# Patient Record
Sex: MALE | Race: WHITE
[De-identification: names, ages, dates, MRNs, and addresses within clinical notes are randomized per-mention and may not be internally consistent; named-entity substitution may affect disease eponyms.]

---

## 2018-07-09 ENCOUNTER — SURGERY (OUTPATIENT)
Age: 68
End: 2018-07-09

## 2018-07-09 ENCOUNTER — ANESTHESIA (OUTPATIENT)
Dept: SURGERY | Facility: CLINIC | Age: 68
DRG: 483 | End: 2018-07-09
Payer: MEDICARE

## 2018-07-09 ENCOUNTER — HOSPITAL ENCOUNTER (INPATIENT)
Facility: CLINIC | Age: 68
LOS: 1 days | Discharge: HOME | DRG: 483 | End: 2018-07-10
Attending: ORTHOPAEDIC SURGERY | Admitting: ORTHOPAEDIC SURGERY
Payer: MEDICARE

## 2018-07-09 ENCOUNTER — ANESTHESIA EVENT (OUTPATIENT)
Dept: SURGERY | Facility: CLINIC | Age: 68
DRG: 483 | End: 2018-07-09
Payer: MEDICARE

## 2018-07-09 ENCOUNTER — APPOINTMENT (OUTPATIENT)
Dept: GENERAL RADIOLOGY | Facility: CLINIC | Age: 68
DRG: 483 | End: 2018-07-09
Attending: ORTHOPAEDIC SURGERY
Payer: MEDICARE

## 2018-07-09 DIAGNOSIS — Z96.611 STATUS POST REVERSE TOTAL REPLACEMENT OF RIGHT SHOULDER: Primary | ICD-10-CM

## 2018-07-09 PROBLEM — Z96.619 S/P REVERSE TOTAL SHOULDER ARTHROPLASTY: Status: ACTIVE | Noted: 2018-07-09

## 2018-07-09 LAB
ANION GAP SERPL CALCULATED.3IONS-SCNC: 7 MMOL/L (ref 3–14)
BUN SERPL-MCNC: 21 MG/DL (ref 7–30)
CALCIUM SERPL-MCNC: 9.1 MG/DL (ref 8.5–10.1)
CHLORIDE SERPL-SCNC: 108 MMOL/L (ref 94–109)
CO2 SERPL-SCNC: 29 MMOL/L (ref 20–32)
CREAT SERPL-MCNC: 0.83 MG/DL (ref 0.66–1.25)
GFR SERPL CREATININE-BSD FRML MDRD: >90 ML/MIN/1.7M2
GLUCOSE SERPL-MCNC: 103 MG/DL (ref 70–99)
HGB BLD-MCNC: 14.9 G/DL (ref 13.3–17.7)
POTASSIUM SERPL-SCNC: 4 MMOL/L (ref 3.4–5.3)
SODIUM SERPL-SCNC: 144 MMOL/L (ref 133–144)

## 2018-07-09 PROCEDURE — 25000128 H RX IP 250 OP 636: Performed by: NURSE ANESTHETIST, CERTIFIED REGISTERED

## 2018-07-09 PROCEDURE — 37000008 ZZH ANESTHESIA TECHNICAL FEE, 1ST 30 MIN: Performed by: ORTHOPAEDIC SURGERY

## 2018-07-09 PROCEDURE — A9270 NON-COVERED ITEM OR SERVICE: HCPCS | Mod: GY | Performed by: ORTHOPAEDIC SURGERY

## 2018-07-09 PROCEDURE — C1713 ANCHOR/SCREW BN/BN,TIS/BN: HCPCS | Performed by: ORTHOPAEDIC SURGERY

## 2018-07-09 PROCEDURE — 71000013 ZZH RECOVERY PHASE 1 LEVEL 1 EA ADDTL HR: Performed by: ORTHOPAEDIC SURGERY

## 2018-07-09 PROCEDURE — 71000012 ZZH RECOVERY PHASE 1 LEVEL 1 FIRST HR: Performed by: ORTHOPAEDIC SURGERY

## 2018-07-09 PROCEDURE — 40000169 ZZH STATISTIC PRE-PROCEDURE ASSESSMENT I: Performed by: ORTHOPAEDIC SURGERY

## 2018-07-09 PROCEDURE — 25000128 H RX IP 250 OP 636: Performed by: ANESTHESIOLOGY

## 2018-07-09 PROCEDURE — C1776 JOINT DEVICE (IMPLANTABLE): HCPCS | Performed by: ORTHOPAEDIC SURGERY

## 2018-07-09 PROCEDURE — 40000986 XR SHOULDER RT PORT G/E 2 VW: Mod: RT

## 2018-07-09 PROCEDURE — 25000566 ZZH SEVOFLURANE, EA 15 MIN: Performed by: ORTHOPAEDIC SURGERY

## 2018-07-09 PROCEDURE — 25000125 ZZHC RX 250: Performed by: NURSE ANESTHETIST, CERTIFIED REGISTERED

## 2018-07-09 PROCEDURE — L3670 SO ACRO/CLAV CAN WEB PRE OTS: HCPCS

## 2018-07-09 PROCEDURE — 25000132 ZZH RX MED GY IP 250 OP 250 PS 637: Mod: GY | Performed by: ORTHOPAEDIC SURGERY

## 2018-07-09 PROCEDURE — 25000128 H RX IP 250 OP 636: Performed by: ORTHOPAEDIC SURGERY

## 2018-07-09 PROCEDURE — 12000007 ZZH R&B INTERMEDIATE

## 2018-07-09 PROCEDURE — 37000009 ZZH ANESTHESIA TECHNICAL FEE, EACH ADDTL 15 MIN: Performed by: ORTHOPAEDIC SURGERY

## 2018-07-09 PROCEDURE — 80048 BASIC METABOLIC PNL TOTAL CA: CPT | Performed by: ORTHOPAEDIC SURGERY

## 2018-07-09 PROCEDURE — 25800025 ZZH RX 258: Performed by: ORTHOPAEDIC SURGERY

## 2018-07-09 PROCEDURE — 27210794 ZZH OR GENERAL SUPPLY STERILE: Performed by: ORTHOPAEDIC SURGERY

## 2018-07-09 PROCEDURE — 36000093 ZZH SURGERY LEVEL 4 1ST 30 MIN: Performed by: ORTHOPAEDIC SURGERY

## 2018-07-09 PROCEDURE — 36415 COLL VENOUS BLD VENIPUNCTURE: CPT | Performed by: ORTHOPAEDIC SURGERY

## 2018-07-09 PROCEDURE — 27210995 ZZH RX 272: Performed by: ORTHOPAEDIC SURGERY

## 2018-07-09 PROCEDURE — 36000063 ZZH SURGERY LEVEL 4 EA 15 ADDTL MIN: Performed by: ORTHOPAEDIC SURGERY

## 2018-07-09 PROCEDURE — 85018 HEMOGLOBIN: CPT | Performed by: ORTHOPAEDIC SURGERY

## 2018-07-09 PROCEDURE — 0RRJ00Z REPLACEMENT OF RIGHT SHOULDER JOINT WITH REVERSE BALL AND SOCKET SYNTHETIC SUBSTITUTE, OPEN APPROACH: ICD-10-PCS | Performed by: ORTHOPAEDIC SURGERY

## 2018-07-09 DEVICE — IMP SCR FIXATION 4.5X29MM AQLS VDV229: Type: IMPLANTABLE DEVICE | Site: SHOULDER | Status: FUNCTIONAL

## 2018-07-09 DEVICE — IMP COMP SHLDR GLENOID SPHERE AQLS REV II 36X29MM 10D DWD191: Type: IMPLANTABLE DEVICE | Site: SHOULDER | Status: FUNCTIONAL

## 2018-07-09 DEVICE — IMP BASEPLATE SHLDR 15MM AQLS DWD003: Type: IMPLANTABLE DEVICE | Site: SHOULDER | Status: FUNCTIONAL

## 2018-07-09 DEVICE — IMP SCR FIXATION 4.5X20MM AQLS VDV220: Type: IMPLANTABLE DEVICE | Site: SHOULDER | Status: FUNCTIONAL

## 2018-07-09 DEVICE — IMP SCR LOCKING 4.5X35MM AQLS DWD035: Type: IMPLANTABLE DEVICE | Site: SHOULDER | Status: FUNCTIONAL

## 2018-07-09 DEVICE — IMP SCR LOCKING 4.5X29MM AQLS DWD029: Type: IMPLANTABLE DEVICE | Site: SHOULDER | Status: FUNCTIONAL

## 2018-07-09 RX ORDER — MAGNESIUM HYDROXIDE 1200 MG/15ML
LIQUID ORAL PRN
Status: DISCONTINUED | OUTPATIENT
Start: 2018-07-09 | End: 2018-07-09 | Stop reason: HOSPADM

## 2018-07-09 RX ORDER — NALOXONE HYDROCHLORIDE 0.4 MG/ML
.1-.4 INJECTION, SOLUTION INTRAMUSCULAR; INTRAVENOUS; SUBCUTANEOUS
Status: DISCONTINUED | OUTPATIENT
Start: 2018-07-09 | End: 2018-07-10 | Stop reason: HOSPADM

## 2018-07-09 RX ORDER — HYDROMORPHONE HYDROCHLORIDE 1 MG/ML
.3-.5 INJECTION, SOLUTION INTRAMUSCULAR; INTRAVENOUS; SUBCUTANEOUS
Status: DISCONTINUED | OUTPATIENT
Start: 2018-07-09 | End: 2018-07-10 | Stop reason: HOSPADM

## 2018-07-09 RX ORDER — FENTANYL CITRATE 50 UG/ML
INJECTION, SOLUTION INTRAMUSCULAR; INTRAVENOUS PRN
Status: DISCONTINUED | OUTPATIENT
Start: 2018-07-09 | End: 2018-07-09

## 2018-07-09 RX ORDER — ONDANSETRON 4 MG/1
4 TABLET, ORALLY DISINTEGRATING ORAL EVERY 30 MIN PRN
Status: DISCONTINUED | OUTPATIENT
Start: 2018-07-09 | End: 2018-07-09 | Stop reason: HOSPADM

## 2018-07-09 RX ORDER — AMOXICILLIN 250 MG
1 CAPSULE ORAL 2 TIMES DAILY
Status: DISCONTINUED | OUTPATIENT
Start: 2018-07-09 | End: 2018-07-10 | Stop reason: HOSPADM

## 2018-07-09 RX ORDER — ONDANSETRON 2 MG/ML
4 INJECTION INTRAMUSCULAR; INTRAVENOUS EVERY 30 MIN PRN
Status: DISCONTINUED | OUTPATIENT
Start: 2018-07-09 | End: 2018-07-09 | Stop reason: HOSPADM

## 2018-07-09 RX ORDER — CEFAZOLIN SODIUM 2 G/100ML
2 INJECTION, SOLUTION INTRAVENOUS EVERY 8 HOURS
Status: COMPLETED | OUTPATIENT
Start: 2018-07-09 | End: 2018-07-10

## 2018-07-09 RX ORDER — ACETAMINOPHEN 325 MG/1
650 TABLET ORAL EVERY 4 HOURS PRN
Status: DISCONTINUED | OUTPATIENT
Start: 2018-07-12 | End: 2018-07-10 | Stop reason: HOSPADM

## 2018-07-09 RX ORDER — LIDOCAINE HYDROCHLORIDE 20 MG/ML
INJECTION, SOLUTION INFILTRATION; PERINEURAL PRN
Status: DISCONTINUED | OUTPATIENT
Start: 2018-07-09 | End: 2018-07-09

## 2018-07-09 RX ORDER — AMOXICILLIN 250 MG
2 CAPSULE ORAL 2 TIMES DAILY
Status: DISCONTINUED | OUTPATIENT
Start: 2018-07-09 | End: 2018-07-10 | Stop reason: HOSPADM

## 2018-07-09 RX ORDER — ONDANSETRON 2 MG/ML
INJECTION INTRAMUSCULAR; INTRAVENOUS PRN
Status: DISCONTINUED | OUTPATIENT
Start: 2018-07-09 | End: 2018-07-09

## 2018-07-09 RX ORDER — PROPOFOL 10 MG/ML
INJECTION, EMULSION INTRAVENOUS PRN
Status: DISCONTINUED | OUTPATIENT
Start: 2018-07-09 | End: 2018-07-09

## 2018-07-09 RX ORDER — ACETAMINOPHEN 325 MG/1
975 TABLET ORAL EVERY 8 HOURS
Status: DISCONTINUED | OUTPATIENT
Start: 2018-07-09 | End: 2018-07-10 | Stop reason: HOSPADM

## 2018-07-09 RX ORDER — PROCHLORPERAZINE MALEATE 5 MG
5 TABLET ORAL EVERY 6 HOURS PRN
Status: DISCONTINUED | OUTPATIENT
Start: 2018-07-09 | End: 2018-07-10 | Stop reason: HOSPADM

## 2018-07-09 RX ORDER — SODIUM CHLORIDE, SODIUM LACTATE, POTASSIUM CHLORIDE, CALCIUM CHLORIDE 600; 310; 30; 20 MG/100ML; MG/100ML; MG/100ML; MG/100ML
INJECTION, SOLUTION INTRAVENOUS CONTINUOUS
Status: DISCONTINUED | OUTPATIENT
Start: 2018-07-09 | End: 2018-07-09 | Stop reason: HOSPADM

## 2018-07-09 RX ORDER — DIPHENHYDRAMINE HYDROCHLORIDE 50 MG/ML
12.5 INJECTION INTRAMUSCULAR; INTRAVENOUS EVERY 6 HOURS PRN
Status: DISCONTINUED | OUTPATIENT
Start: 2018-07-09 | End: 2018-07-10 | Stop reason: HOSPADM

## 2018-07-09 RX ORDER — ONDANSETRON 4 MG/1
4 TABLET, ORALLY DISINTEGRATING ORAL EVERY 6 HOURS PRN
Status: DISCONTINUED | OUTPATIENT
Start: 2018-07-09 | End: 2018-07-10 | Stop reason: HOSPADM

## 2018-07-09 RX ORDER — METHOCARBAMOL 500 MG/1
500 TABLET, FILM COATED ORAL 4 TIMES DAILY PRN
Status: DISCONTINUED | OUTPATIENT
Start: 2018-07-09 | End: 2018-07-10 | Stop reason: HOSPADM

## 2018-07-09 RX ORDER — FENTANYL CITRATE 50 UG/ML
25-50 INJECTION, SOLUTION INTRAMUSCULAR; INTRAVENOUS
Status: DISCONTINUED | OUTPATIENT
Start: 2018-07-09 | End: 2018-07-09 | Stop reason: HOSPADM

## 2018-07-09 RX ORDER — GLYCOPYRROLATE 0.2 MG/ML
INJECTION, SOLUTION INTRAMUSCULAR; INTRAVENOUS PRN
Status: DISCONTINUED | OUTPATIENT
Start: 2018-07-09 | End: 2018-07-09

## 2018-07-09 RX ORDER — CEFAZOLIN SODIUM 1 G/3ML
1 INJECTION, POWDER, FOR SOLUTION INTRAMUSCULAR; INTRAVENOUS SEE ADMIN INSTRUCTIONS
Status: DISCONTINUED | OUTPATIENT
Start: 2018-07-09 | End: 2018-07-09

## 2018-07-09 RX ORDER — CEFAZOLIN SODIUM 2 G/100ML
2 INJECTION, SOLUTION INTRAVENOUS
Status: COMPLETED | OUTPATIENT
Start: 2018-07-09 | End: 2018-07-09

## 2018-07-09 RX ORDER — HYDROMORPHONE HYDROCHLORIDE 1 MG/ML
.3-.5 INJECTION, SOLUTION INTRAMUSCULAR; INTRAVENOUS; SUBCUTANEOUS EVERY 5 MIN PRN
Status: DISCONTINUED | OUTPATIENT
Start: 2018-07-09 | End: 2018-07-09 | Stop reason: HOSPADM

## 2018-07-09 RX ORDER — KETOROLAC TROMETHAMINE 15 MG/ML
15 INJECTION, SOLUTION INTRAMUSCULAR; INTRAVENOUS EVERY 6 HOURS
Status: COMPLETED | OUTPATIENT
Start: 2018-07-09 | End: 2018-07-10

## 2018-07-09 RX ORDER — LIDOCAINE 40 MG/G
CREAM TOPICAL
Status: DISCONTINUED | OUTPATIENT
Start: 2018-07-09 | End: 2018-07-10 | Stop reason: HOSPADM

## 2018-07-09 RX ORDER — DEXTROSE MONOHYDRATE, SODIUM CHLORIDE, AND POTASSIUM CHLORIDE 50; 1.49; 4.5 G/1000ML; G/1000ML; G/1000ML
INJECTION, SOLUTION INTRAVENOUS CONTINUOUS
Status: DISCONTINUED | OUTPATIENT
Start: 2018-07-09 | End: 2018-07-10 | Stop reason: HOSPADM

## 2018-07-09 RX ORDER — FENTANYL CITRATE 50 UG/ML
50 INJECTION, SOLUTION INTRAMUSCULAR; INTRAVENOUS ONCE
Status: COMPLETED | OUTPATIENT
Start: 2018-07-09 | End: 2018-07-09

## 2018-07-09 RX ORDER — OXYCODONE HYDROCHLORIDE 5 MG/1
5-10 TABLET ORAL
Status: DISCONTINUED | OUTPATIENT
Start: 2018-07-09 | End: 2018-07-10 | Stop reason: HOSPADM

## 2018-07-09 RX ORDER — NALOXONE HYDROCHLORIDE 0.4 MG/ML
.1-.4 INJECTION, SOLUTION INTRAMUSCULAR; INTRAVENOUS; SUBCUTANEOUS
Status: DISCONTINUED | OUTPATIENT
Start: 2018-07-09 | End: 2018-07-09

## 2018-07-09 RX ORDER — SODIUM CHLORIDE, SODIUM LACTATE, POTASSIUM CHLORIDE, CALCIUM CHLORIDE 600; 310; 30; 20 MG/100ML; MG/100ML; MG/100ML; MG/100ML
INJECTION, SOLUTION INTRAVENOUS CONTINUOUS
Status: DISCONTINUED | OUTPATIENT
Start: 2018-07-09 | End: 2018-07-10 | Stop reason: HOSPADM

## 2018-07-09 RX ORDER — EPHEDRINE SULFATE 50 MG/ML
INJECTION, SOLUTION INTRAMUSCULAR; INTRAVENOUS; SUBCUTANEOUS PRN
Status: DISCONTINUED | OUTPATIENT
Start: 2018-07-09 | End: 2018-07-09

## 2018-07-09 RX ORDER — DIPHENHYDRAMINE HCL 12.5MG/5ML
12.5 LIQUID (ML) ORAL EVERY 6 HOURS PRN
Status: DISCONTINUED | OUTPATIENT
Start: 2018-07-09 | End: 2018-07-10 | Stop reason: HOSPADM

## 2018-07-09 RX ORDER — DEXAMETHASONE SODIUM PHOSPHATE 4 MG/ML
INJECTION, SOLUTION INTRA-ARTICULAR; INTRALESIONAL; INTRAMUSCULAR; INTRAVENOUS; SOFT TISSUE PRN
Status: DISCONTINUED | OUTPATIENT
Start: 2018-07-09 | End: 2018-07-09

## 2018-07-09 RX ORDER — ONDANSETRON 2 MG/ML
4 INJECTION INTRAMUSCULAR; INTRAVENOUS EVERY 6 HOURS PRN
Status: DISCONTINUED | OUTPATIENT
Start: 2018-07-09 | End: 2018-07-10 | Stop reason: HOSPADM

## 2018-07-09 RX ORDER — NEOSTIGMINE METHYLSULFATE 1 MG/ML
VIAL (ML) INJECTION PRN
Status: DISCONTINUED | OUTPATIENT
Start: 2018-07-09 | End: 2018-07-09

## 2018-07-09 RX ADMIN — CEFAZOLIN SODIUM 2 G: 2 INJECTION, SOLUTION INTRAVENOUS at 07:50

## 2018-07-09 RX ADMIN — DEXAMETHASONE SODIUM PHOSPHATE 4 MG: 4 INJECTION, SOLUTION INTRA-ARTICULAR; INTRALESIONAL; INTRAMUSCULAR; INTRAVENOUS; SOFT TISSUE at 08:45

## 2018-07-09 RX ADMIN — PHENYLEPHRINE HYDROCHLORIDE 100 MCG: 10 INJECTION, SOLUTION INTRAMUSCULAR; INTRAVENOUS; SUBCUTANEOUS at 07:40

## 2018-07-09 RX ADMIN — FENTANYL CITRATE 50 MCG: 50 INJECTION, SOLUTION INTRAMUSCULAR; INTRAVENOUS at 09:18

## 2018-07-09 RX ADMIN — OXYCODONE HYDROCHLORIDE 5 MG: 5 TABLET ORAL at 22:35

## 2018-07-09 RX ADMIN — ACETAMINOPHEN 975 MG: 325 TABLET, FILM COATED ORAL at 15:29

## 2018-07-09 RX ADMIN — ACETAMINOPHEN 975 MG: 325 TABLET, FILM COATED ORAL at 21:34

## 2018-07-09 RX ADMIN — POTASSIUM CHLORIDE, DEXTROSE MONOHYDRATE AND SODIUM CHLORIDE: 150; 5; 450 INJECTION, SOLUTION INTRAVENOUS at 12:39

## 2018-07-09 RX ADMIN — Medication 5 MG: at 07:54

## 2018-07-09 RX ADMIN — PROPOFOL 200 MG: 10 INJECTION, EMULSION INTRAVENOUS at 07:30

## 2018-07-09 RX ADMIN — PHENYLEPHRINE HYDROCHLORIDE: 10 INJECTION, SOLUTION INTRAMUSCULAR; INTRAVENOUS; SUBCUTANEOUS at 07:30

## 2018-07-09 RX ADMIN — SODIUM CHLORIDE, POTASSIUM CHLORIDE, SODIUM LACTATE AND CALCIUM CHLORIDE: 600; 310; 30; 20 INJECTION, SOLUTION INTRAVENOUS at 08:30

## 2018-07-09 RX ADMIN — POTASSIUM CHLORIDE, DEXTROSE MONOHYDRATE AND SODIUM CHLORIDE: 150; 5; 450 INJECTION, SOLUTION INTRAVENOUS at 22:52

## 2018-07-09 RX ADMIN — ROCURONIUM BROMIDE 20 MG: 10 INJECTION INTRAVENOUS at 08:15

## 2018-07-09 RX ADMIN — ONDANSETRON 4 MG: 2 INJECTION INTRAMUSCULAR; INTRAVENOUS at 08:45

## 2018-07-09 RX ADMIN — PHENYLEPHRINE HYDROCHLORIDE 100 MCG: 10 INJECTION, SOLUTION INTRAMUSCULAR; INTRAVENOUS; SUBCUTANEOUS at 08:00

## 2018-07-09 RX ADMIN — Medication 5 MG: at 07:50

## 2018-07-09 RX ADMIN — SODIUM CHLORIDE, POTASSIUM CHLORIDE, SODIUM LACTATE AND CALCIUM CHLORIDE: 600; 310; 30; 20 INJECTION, SOLUTION INTRAVENOUS at 09:41

## 2018-07-09 RX ADMIN — MIDAZOLAM 2 MG: 1 INJECTION INTRAMUSCULAR; INTRAVENOUS at 07:25

## 2018-07-09 RX ADMIN — FENTANYL CITRATE 50 MCG: 50 INJECTION, SOLUTION INTRAMUSCULAR; INTRAVENOUS at 07:30

## 2018-07-09 RX ADMIN — OXYCODONE HYDROCHLORIDE 5 MG: 5 TABLET ORAL at 18:18

## 2018-07-09 RX ADMIN — Medication 10 MG: at 08:56

## 2018-07-09 RX ADMIN — ROPIVACAINE HYDROCHLORIDE 20 ML GIVEN: 5 INJECTION, SOLUTION EPIDURAL; INFILTRATION; PERINEURAL at 07:06

## 2018-07-09 RX ADMIN — MIDAZOLAM HYDROCHLORIDE 1 MG: 1 INJECTION, SOLUTION INTRAMUSCULAR; INTRAVENOUS at 07:14

## 2018-07-09 RX ADMIN — CEFAZOLIN SODIUM 2 G: 2 INJECTION, SOLUTION INTRAVENOUS at 16:27

## 2018-07-09 RX ADMIN — PHENYLEPHRINE HYDROCHLORIDE 100 MCG: 10 INJECTION, SOLUTION INTRAMUSCULAR; INTRAVENOUS; SUBCUTANEOUS at 08:45

## 2018-07-09 RX ADMIN — SODIUM CHLORIDE 1000 ML: 900 IRRIGANT IRRIGATION at 08:08

## 2018-07-09 RX ADMIN — SODIUM CHLORIDE, POTASSIUM CHLORIDE, SODIUM LACTATE AND CALCIUM CHLORIDE: 600; 310; 30; 20 INJECTION, SOLUTION INTRAVENOUS at 06:25

## 2018-07-09 RX ADMIN — PHENYLEPHRINE HYDROCHLORIDE 100 MCG: 10 INJECTION, SOLUTION INTRAMUSCULAR; INTRAVENOUS; SUBCUTANEOUS at 07:45

## 2018-07-09 RX ADMIN — GLYCOPYRROLATE 0.2 MG: 0.2 INJECTION, SOLUTION INTRAMUSCULAR; INTRAVENOUS at 09:25

## 2018-07-09 RX ADMIN — GENTAMICIN SULFATE 1000 ML: 40 INJECTION, SOLUTION INTRAMUSCULAR; INTRAVENOUS at 08:08

## 2018-07-09 RX ADMIN — PHENYLEPHRINE HYDROCHLORIDE 100 MCG: 10 INJECTION, SOLUTION INTRAMUSCULAR; INTRAVENOUS; SUBCUTANEOUS at 08:40

## 2018-07-09 RX ADMIN — LIDOCAINE HYDROCHLORIDE 100 MG: 20 INJECTION, SOLUTION INFILTRATION; PERINEURAL at 07:30

## 2018-07-09 RX ADMIN — KETOROLAC TROMETHAMINE 15 MG: 15 INJECTION, SOLUTION INTRAMUSCULAR; INTRAVENOUS at 15:29

## 2018-07-09 RX ADMIN — FENTANYL CITRATE 50 MCG: 50 INJECTION INTRAMUSCULAR; INTRAVENOUS at 07:14

## 2018-07-09 RX ADMIN — NEOSTIGMINE METHYLSULFATE 2 MG: 1 INJECTION, SOLUTION INTRAVENOUS at 09:25

## 2018-07-09 RX ADMIN — Medication 5 MG: at 08:00

## 2018-07-09 RX ADMIN — KETOROLAC TROMETHAMINE 15 MG: 15 INJECTION, SOLUTION INTRAMUSCULAR; INTRAVENOUS at 21:34

## 2018-07-09 RX ADMIN — PHENYLEPHRINE HYDROCHLORIDE 100 MCG: 10 INJECTION, SOLUTION INTRAMUSCULAR; INTRAVENOUS; SUBCUTANEOUS at 07:50

## 2018-07-09 ASSESSMENT — LIFESTYLE VARIABLES: TOBACCO_USE: 1

## 2018-07-09 ASSESSMENT — ENCOUNTER SYMPTOMS: SEIZURES: 0

## 2018-07-09 NOTE — OP NOTE
PATIENT NAME:  Martin Ceron  MEDICAL RECORD #: [id number]  PATIENT BIRTHDAY:           1950  DATE OF SURGERY: 7/9/2018    SURGEON:     Richie Lopes MD     1st ASSISTANTt:  KEV Moss OPA-C      PREOPERATIVE DIAGNOSIS:  Rotator cuff arthropathy right shoulder.      POSTOPERATIVE DIAGNOSIS:  Rotator cuff arthropathy right shoulder.      PROCEDURE: right reverse total shoulder arthroplasty.    COMPONENTS USED:  Tornier 6B standard PTC  humeral stem, 9 mm humeral poly insert, 29 mm glenoid baseplate, 35 mm 10 degree tilted glenosphere, plus appropriate length compression and locking screws.     INDICATIONS FOR PROCEDURE:  The patient is brought to the operating room for elective right Reverse TSA for rotator cuff arthropathy.  Preoperative IV antibiotics were given and will be continued for 24 hours post-op.  The patient understands the indications, alternatives, risks, benefits, and time involved for recovery and is consented for the procedure.        DESCRIPTION OF PROCEDURE:  The patient was brought to the operating room on 7/9/2018 and following suitable general anesthesia, the patient was placed in the semi-sitting position and the right shoulder prepped and draped in the usual manner.      A full time out was carried out and the patient, proper extremity, operative site and procedure identified and confirmed by all members of the operative team.      We used the superolateral approach with an incision made over the distal clavicle, the anterior edge of the acromion, out to the anterolateral edge of the acromion and down in line with the deltoid fibers for 5 cm.  The deltoid was split in line with its fibers for 4 cm and released from the anterior edge of the acromion and clavicle.  The underlying rotator cuff was completely torn and retracted, especially involving the supraspinatus and infraspinatus.  The subscapularis was partially released along the superior attachment.  The long head biceps  tendon was torn and retracted distally.      The rotator cuff was debrided superiorly to allow for superior and anterior dislocation of the humerus.  We then resected the proximal end of the humerus in the usual fashion in 20 degrees of retroversion.  We prepared the canal with the rasps to the 7 size.  We then trialed the 6 mm humeral component and noted it was well seated and had good rotational stability.      We next retracted the humerus inferiorly.  I did partially release the subscap from the lesser tuberosity and released the glenoid labrum and capsular structures to allow for inferior displacement of the proximally displaced humeral head.  Once we had good exposure of the glenoid, we then prepared the glenoid face by placing a centering hole followed by planing off the face of the glenoid with the circular reamer back to subchondral bone.  The 29 mm base plate was secured to the glenoid with two locking screws and 2 compression screws.  Secure fixation was obtained.  The 36 mm 10 degree tilted glenosphere was next secured to baseplate.      Attention was directed back to the humerus.  We trialed and determined that the 9 mm polyethylene articular insert would be used. The trial components were removed. The final components were assembled and implanted.  Motion, stability and alignment was good.  Tensioning of the deltoid was good.      The wound was irrigated throughout with antibiotic solution using jet lavage.  It was closed over a medium Hemovac drain by initially placing 0 Vicryl in a running fashion to reinforce the edge of the anterior deltoid.  The running suture was placed 1 cm from the edge of the deltoid.  I then used #2 FiberWire looping around the 0 Vicryl reinforcing suture in the deltoid and then up through drill holes in the distal clavicle and acromion.  After these 4 sutures were placed through the bone and through the anterior deltoid, we then used #1 Ethibond in interrupted and running  fashion over the top of the superior portion of the deltoid repair and then also running in the distal portion.  Subcutaneous tissue was closed with 2-0 undyed Vicryl and skin with skin staples.  Sterile soft dressing was applied.  The patient was placed in an UltraSling, awakened and taken to PAR in satisfactory condition.     A surgical assistant was medically necessary and required during this procedure for positioning and retraction.  He was present for the entire procedure.          ASH FIGUEROA MD      CC: Ash Figueroa MD          Fax: [phone]

## 2018-07-09 NOTE — IP AVS SNAPSHOT
[address] [facility]    [address] [place] [address]    Phone:  [phone]                                       After Visit Summary   7/9/2018    Martin Ceron    MRN: [id number]           After Visit Summary Signature Page     I have received my discharge instructions, and my questions have been answered. I have discussed any challenges I see with this plan with the nurse or doctor.    ..........................................................................................................................................  Patient/Patient Representative Signature      ..........................................................................................................................................  Patient Representative Print Name and Relationship to Patient    ..................................................               ................................................  Date                                            Time    ..........................................................................................................................................  Reviewed by Signature/Title    ...................................................              ..............................................  Date                                                            Time

## 2018-07-09 NOTE — PROGRESS NOTES
Admission medication history interview status for the 7/9/2018  admission is complete. See EPIC admission navigator for prior to admission medications     Medication history source reliability:Good    Medication history interview source(s):Patient    Medication history resources (including written lists, pill bottles, clinic record):None    Primary pharmacy.[facility]    Additional medication history information not noted on PTA med list :None    Time spent in this activity: 40 minutes    Prior to Admission medications    Medication Sig Last Dose Taking? Auth Provider   Tadalafil (CIALIS PO) Take 20 mg by mouth daily as needed for erectile dysfunction (4 x 5 mg = 20 mg dose) More than a Month at PRN Yes Reported, Patient

## 2018-07-09 NOTE — ANESTHESIA CARE TRANSFER NOTE
Patient: Martin Ceron    Procedure(s):  RIGHT REVERSE TOTAL SHOULDER ARTHROPLASTY - Wound Class: I-Clean    Diagnosis: RIGHT SHOULDER ARTHROPATHY  Diagnosis Additional Information: No value filed.    Anesthesia Type:   General, LMA, Periph. Nerve Block for postop pain     Note:  Airway :Face Mask  Patient transferred to:PACU  Comments: Pt sv good tidal volumes, awake LMA removed prepare to transfer to PACU,  Report to PACU RN.  VSS transfer careHandoff Report: Identifed the Patient, Identified the Reponsible Provider, Reviewed the pertinent medical history, Discussed the surgical course, Reviewed Intra-OP anesthesia mangement and issues during anesthesia, Set expectations for post-procedure period and Allowed opportunity for questions and acknowledgement of understanding      Vitals: (Last set prior to Anesthesia Care Transfer)    CRNA VITALS  7/9/2018 0917 - 7/9/2018 0959      7/9/2018             Pulse: 78    SpO2: 99 %    Resp Rate (set): 10                Electronically Signed By: PALLAVI Cruz CRNA  July 9, 2018  9:59 AM

## 2018-07-09 NOTE — ANESTHESIA PROCEDURE NOTES
Peripheral nerve/Neuraxial procedure note : Interscalene  Pre-Procedure  Performed by BING MCKEE  Location: pre-op      Pre-Anesthestic Checklist: patient identified, IV checked, site marked, risks and benefits discussed, informed consent, monitors and equipment checked, pre-op evaluation, at physician/surgeon's request and post-op pain management    Timeout  Correct Patient: Yes   Correct Procedure: Yes   Correct Site: Yes   Correct Laterality: Yes   Correct Position: Yes   Site Marked: Yes   .   Procedure Documentation    .    Procedure:  right  Interscalene.  Local skin infiltrated with 3 mL of 1% lidocaine.     Ultrasound used to identify targeted nerve, plexus, or vascular marker and placed a needle adjacent to it., Ultrasound was used to visualize the spread of the anesthetic in close proximity to the above stated nerve. A permanent image is entered into the patient's record.  Patient Prep;mask, sterile gloves, chlorhexidine gluconate and isopropyl alcohol.  .  Needle: Needle Gauge: 21. Needle Length (millimeters) 50  Insertion Method: Single Shot.       Assessment/Narrative  Paresthesias: No.  .  The placement was negative for: blood aspirated, painful injection and site bleeding.  Bolus given via needle..   Secured via.   Complications: none. Comments:  Ropivacaine 0.5% with 1:400,000 epi 20ml   Patient sedated but commutative throughout the procedure.   Patient tolerated well.    The surgeon has given a verbal order transferring this pt to me for a performance of regional analgesia block for post op pain control. It is requested of me because I am trained and qualifed to perform this block and the surgeon is nether trained nor qualified to perform this procedure.

## 2018-07-09 NOTE — PROGRESS NOTES
S. We received an order for an right shoulder Ultrasling III at the main OR.    O/goal. To reduce motion and help with post-op support    A. At this time, I have provided the main OR with a size large Jonah Romano Ultrasling III.    P. Staffing have been instructed to contact our facility with any future questions and/or concerns.

## 2018-07-09 NOTE — ANESTHESIA PREPROCEDURE EVALUATION
Anesthesia Evaluation     . Pt has had prior anesthetic.     No history of anesthetic complications          ROS/MED HX    ENT/Pulmonary:     (+)tobacco use, Past use Intermittent asthma , . .   (-) sleep apnea   Neurologic:      (-) seizures and CVA   Cardiovascular:        (-) hypertension   METS/Exercise Tolerance:  >4 METS   Hematologic:         Musculoskeletal: Comment: Hx C2 fx   (+) arthritis, , , -       GI/Hepatic:        (-) GERD and liver disease   Renal/Genitourinary:      (-) renal disease   Endo:      (-) Type II DM and thyroid disease   Psychiatric:     (+) psychiatric history anxiety      Infectious Disease:         Malignancy:         Other:                     Physical Exam  Normal systems: cardiovascular, pulmonary and dental    Airway   Mallampati: II  TM distance: >3 FB  Neck ROM: full    Dental     Cardiovascular       Pulmonary                     Anesthesia Plan      History & Physical Review  History and physical reviewed and following examination; no interval change.    ASA Status:  2 .    NPO Status:  > 8 hours    Plan for General, LMA and Periph. Nerve Block for postop pain with Intravenous induction. Maintenance will be Balanced.    PONV prophylaxis:  Ondansetron (or other 5HT-3) and Dexamethasone or Solumedrol  Propofol gtt      Postoperative Care  Postoperative pain management:  IV analgesics and Multi-modal analgesia.      Consents  Anesthetic plan, risks, benefits and alternatives discussed with:  Patient..        Procedure: Procedure(s):  REVERSE ARTHROPLASTY SHOULDER  Preop diagnosis: RIGHT SHOULDER ARTHROPATHY    No Known Allergies  Past Medical History:   Diagnosis Date     Allergic rhinitis      Anxiety      Arthralgia      Closed fracture of second cervical vertebra without spinal cord injury (H)      Disorder of right rotator cuff      Plantar fasciitis      Strain of tendon of left rotator cuff      Past Surgical History:   Procedure Laterality Date     APPENDECTOMY        SHOULDER SURGERY Left     rotator repair     Prior to Admission medications    Medication Sig Start Date End Date Taking? Authorizing Provider   Tadalafil (CIALIS PO) Take 20 mg by mouth daily as needed for erectile dysfunction (4 x 5 mg = 20 mg dose)   Yes Reported, Patient     Current Facility-Administered Medications Ordered in Epic   Medication Dose Route Frequency Last Rate Last Dose     ceFAZolin (ANCEF) 1 g vial to attach to  ml bag for ADULT or 50 ml bag for PEDS  1 g Intravenous See Admin Instructions         ceFAZolin (ANCEF) intermittent infusion 2 g in 100 mL dextrose PRE-MIX  2 g Intravenous Pre-Op/Pre-procedure x 1 dose         lactated ringers infusion   Intravenous Continuous 25 mL/hr at 07/09/18 0625       lidocaine 1 % 1 mL  1 mL Other Q1H PRN         No current [facility]-ordered outpatient prescriptions on file.     Wt Readings from Last 1 Encounters:   07/09/18 88.7 kg (195 lb 8 oz)     Temp Readings from Last 1 Encounters:   07/09/18 36.1  C (97  F) (Temporal)     BP Readings from Last 6 Encounters:   07/09/18 129/86     Pulse Readings from Last 4 Encounters:   No data found for Pulse     Resp Readings from Last 1 Encounters:   07/09/18 16     SpO2 Readings from Last 1 Encounters:   07/09/18 96%     Recent Labs   Lab Test  07/09/18   0610   NA  144   POTASSIUM  4.0   CHLORIDE  108   CO2  29   ANIONGAP  7   GLC  103*   BUN  21   CR  0.83   LATONIA  9.1     Recent Labs   Lab Test  07/09/18   0610   HGB  14.9     No results for input(s): INR in the last 31204 hours.    Invalid input(s): APTT   RECENT LABS:   ECG:   ECHO:   CXR:                      .

## 2018-07-09 NOTE — PLAN OF CARE
Problem: Patient Care Overview  Goal: Plan of Care/Patient Progress Review  Outcome: No Change  Alert and oriented x4 . Hard of hearing . Tolerating clear liquid diet . Denies pain . CMS intact . Dressing C/D/I. Vital signs stable .

## 2018-07-09 NOTE — ANESTHESIA POSTPROCEDURE EVALUATION
Patient: Martin Ceron    Procedure(s):  RIGHT REVERSE TOTAL SHOULDER ARTHROPLASTY - Wound Class: I-Clean    Diagnosis:RIGHT SHOULDER ARTHROPATHY  Diagnosis Additional Information: No value filed.    Anesthesia Type:  General, LMA, Periph. Nerve Block for postop pain    Note:  Anesthesia Post Evaluation    Patient location during evaluation: Bedside  Patient participation: Able to fully participate in evaluation  Level of consciousness: awake and alert  Pain management: adequate  Airway patency: patent  Cardiovascular status: acceptable  Respiratory status: acceptable  Hydration status: acceptable  PONV: none             Last vitals:  Vitals:    07/09/18 1320 07/09/18 1420 07/09/18 1601   BP: 94/63 108/71 103/67   Resp: 22 17 18   Temp:   36.4  C (97.6  F)   SpO2: 94% 96% 95%         Electronically Signed By: Joseph Agee MD  July 9, 2018  5:12 PM

## 2018-07-10 ENCOUNTER — APPOINTMENT (OUTPATIENT)
Dept: OCCUPATIONAL THERAPY | Facility: CLINIC | Age: 68
DRG: 483 | End: 2018-07-10
Attending: ORTHOPAEDIC SURGERY
Payer: MEDICARE

## 2018-07-10 VITALS
HEIGHT: 70 IN | BODY MASS INDEX: 27.99 KG/M2 | OXYGEN SATURATION: 98 % | TEMPERATURE: 97.5 F | WEIGHT: 195.5 LBS | DIASTOLIC BLOOD PRESSURE: 70 MMHG | SYSTOLIC BLOOD PRESSURE: 111 MMHG | RESPIRATION RATE: 16 BRPM

## 2018-07-10 LAB
ANION GAP SERPL CALCULATED.3IONS-SCNC: 6 MMOL/L (ref 3–14)
BUN SERPL-MCNC: 19 MG/DL (ref 7–30)
CALCIUM SERPL-MCNC: 8.1 MG/DL (ref 8.5–10.1)
CHLORIDE SERPL-SCNC: 105 MMOL/L (ref 94–109)
CO2 SERPL-SCNC: 27 MMOL/L (ref 20–32)
CREAT SERPL-MCNC: 0.86 MG/DL (ref 0.66–1.25)
GFR SERPL CREATININE-BSD FRML MDRD: 88 ML/MIN/1.7M2
GLUCOSE SERPL-MCNC: 113 MG/DL (ref 70–99)
HGB BLD-MCNC: 11.7 G/DL (ref 13.3–17.7)
PLATELET # BLD AUTO: 197 10E9/L (ref 150–450)
POTASSIUM SERPL-SCNC: 4 MMOL/L (ref 3.4–5.3)
SODIUM SERPL-SCNC: 138 MMOL/L (ref 133–144)

## 2018-07-10 PROCEDURE — 97110 THERAPEUTIC EXERCISES: CPT | Mod: GO

## 2018-07-10 PROCEDURE — 36415 COLL VENOUS BLD VENIPUNCTURE: CPT | Performed by: ORTHOPAEDIC SURGERY

## 2018-07-10 PROCEDURE — 25000128 H RX IP 250 OP 636: Performed by: ORTHOPAEDIC SURGERY

## 2018-07-10 PROCEDURE — 97535 SELF CARE MNGMENT TRAINING: CPT | Mod: GO

## 2018-07-10 PROCEDURE — 97165 OT EVAL LOW COMPLEX 30 MIN: CPT | Mod: GO

## 2018-07-10 PROCEDURE — 40000133 ZZH STATISTIC OT WARD VISIT

## 2018-07-10 PROCEDURE — 80048 BASIC METABOLIC PNL TOTAL CA: CPT | Performed by: ORTHOPAEDIC SURGERY

## 2018-07-10 PROCEDURE — A9270 NON-COVERED ITEM OR SERVICE: HCPCS | Mod: GY | Performed by: ORTHOPAEDIC SURGERY

## 2018-07-10 PROCEDURE — 25000132 ZZH RX MED GY IP 250 OP 250 PS 637: Mod: GY | Performed by: ORTHOPAEDIC SURGERY

## 2018-07-10 PROCEDURE — 85018 HEMOGLOBIN: CPT | Performed by: ORTHOPAEDIC SURGERY

## 2018-07-10 PROCEDURE — 85049 AUTOMATED PLATELET COUNT: CPT | Performed by: ORTHOPAEDIC SURGERY

## 2018-07-10 RX ORDER — AMOXICILLIN 250 MG
1 CAPSULE ORAL 2 TIMES DAILY
Qty: 50 TABLET | Refills: 0 | Status: SHIPPED | OUTPATIENT
Start: 2018-07-10

## 2018-07-10 RX ORDER — OXYCODONE HYDROCHLORIDE 5 MG/1
5-10 TABLET ORAL EVERY 4 HOURS PRN
Qty: 40 TABLET | Refills: 0 | Status: SHIPPED | OUTPATIENT
Start: 2018-07-10

## 2018-07-10 RX ADMIN — OXYCODONE HYDROCHLORIDE 10 MG: 5 TABLET ORAL at 05:15

## 2018-07-10 RX ADMIN — CEFAZOLIN SODIUM 2 G: 2 INJECTION, SOLUTION INTRAVENOUS at 00:02

## 2018-07-10 RX ADMIN — OXYCODONE HYDROCHLORIDE 10 MG: 5 TABLET ORAL at 01:43

## 2018-07-10 RX ADMIN — KETOROLAC TROMETHAMINE 15 MG: 15 INJECTION, SOLUTION INTRAMUSCULAR; INTRAVENOUS at 01:43

## 2018-07-10 RX ADMIN — METHOCARBAMOL 500 MG: 500 TABLET ORAL at 00:02

## 2018-07-10 RX ADMIN — ACETAMINOPHEN 975 MG: 325 TABLET, FILM COATED ORAL at 14:38

## 2018-07-10 RX ADMIN — SENNOSIDES AND DOCUSATE SODIUM 1 TABLET: 8.6; 5 TABLET ORAL at 08:07

## 2018-07-10 RX ADMIN — OXYCODONE HYDROCHLORIDE 10 MG: 5 TABLET ORAL at 14:38

## 2018-07-10 RX ADMIN — KETOROLAC TROMETHAMINE 15 MG: 15 INJECTION, SOLUTION INTRAMUSCULAR; INTRAVENOUS at 08:07

## 2018-07-10 RX ADMIN — OXYCODONE HYDROCHLORIDE 10 MG: 5 TABLET ORAL at 08:07

## 2018-07-10 RX ADMIN — ACETAMINOPHEN 975 MG: 325 TABLET, FILM COATED ORAL at 05:15

## 2018-07-10 ASSESSMENT — ACTIVITIES OF DAILY LIVING (ADL): PREVIOUS_RESPONSIBILITIES: MEAL PREP;HOUSEKEEPING;LAUNDRY;SHOPPING;YARDWORK;MEDICATION MANAGEMENT;FINANCES;DRIVING;WORK

## 2018-07-10 NOTE — PROGRESS NOTES
07/10/18 0817   Quick Adds   Type of Visit Initial Occupational Therapy Evaluation   Living Environment   Lives With spouse   Living Arrangements house   Home Accessibility stairs to enter home;stairs within home;tub/shower is not walk in   Number of Stairs to Enter Home 3   Number of Stairs Within Home 13  (basement)   Transportation Available car;family or friend will provide   Self-Care   Dominant Hand right   Usual Activity Tolerance good   Current Activity Tolerance fair   Regular Exercise no   Equipment Currently Used at Home none   Functional Level Prior   Ambulation 0-->independent   Transferring 0-->independent   Toileting 0-->independent   Bathing 0-->independent   Dressing 0-->independent   Eating 0-->independent   Communication 0-->understands/communicates without difficulty   Swallowing 0-->swallows foods/liquids without difficulty   Cognition 0 - no cognition issues reported   Fall history within last six months no   Prior Functional Level Comment I IADL, works as simms, 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 Richie Lopes   Patient/Family Goals Statement return home   Additional Occupational Profile Info/Pertinent History of Current Problem s/p R reverse TSA   Precautions/Limitations (No russel ROM/sling x 6 wks, ONLY elbow, wrist, hand ROM)   Weight-Bearing Status - RUE nonweight-bearing   Cognitive Status Examination   Orientation orientation to person, place and time   Level of Consciousness alert   Able to Follow Commands WNL/WFL   Memory intact   Visual Perception   Visual Perception No deficits were identified;Wears glasses   Sensory Examination   Sensory Quick Adds No deficits were identified   Pain Assessment   Patient Currently in Pain Yes, see Vital Sign flowsheet   Range of Motion (ROM)   ROM Comment LUE WNL   Strength   Strength Comments LUE 5/5 throughout   Mobility   Bed Mobility Comments  "Indep bed flat no rail   Transfer Skills   Transfer Comments Indep toilet, tub, bed   Balance   Balance Comments Indep sitting, standing, amb no AD   Upper Body Dressing   Level of [place]: Dress Upper Body moderate assist (50% patients effort)   Physical Assist/Nonphysical Assist: Dress Upper Body 1 person assist;verbal cues   Grooming   Level of [place]: Grooming stand-by assist   Physical Assist/Nonphysical Assist: Grooming set-up required   Eating/Self Feeding   Level of [place]: Eating independent   Physical Assist/Nonphysical Assist: Eating set-up required  (using non-dom L hand)   Instrumental Activities of Daily Living (IADL)   Previous Responsibilities meal prep;housekeeping;laundry;shopping;yardwork;medication management;finances;driving;work   Activities of Daily Living Analysis   Impairments Contributing to Impaired Activities of Daily Living pain;post surgical precautions;ROM decreased;strength decreased  (dominant RUE)   General Therapy Interventions   Planned Therapy Interventions ADL retraining;IADL retraining;transfer training;home program guidelines;ROM   Clinical Impression   Criteria for Skilled Therapeutic Interventions Met yes, treatment indicated   OT Diagnosis decreased ADL/IADL performance   Influenced by the following impairments post-op R russel pain, decreased ROM, strength, post-op precautions/immob   Assessment of Occupational Performance 3-5 Performance Deficits   Identified Performance Deficits dressing, toileting, bathing, household mgmt   Clinical Decision Making (Complexity) Low complexity   Therapy Frequency 2 times/day   Predicted Duration of Therapy Intervention (days/wks) 1 day   Anticipated Discharge Disposition Home with Assist;Home with Outpatient Therapy   Risks and Benefits of Treatment have been explained. Yes   Patient, Family & other staff in agreement with plan of care Yes   [facility] AM-PAC TM \"6 Clicks\"   2016, Trustees of [facility], under " license to [employer]  All rights reserved.   6 Clicks Short Forms Daily Activity Inpatient Short Form   Total Evaluation Time   Total Evaluation Time (Minutes) 15

## 2018-07-10 NOTE — PLAN OF CARE
Problem: Shoulder Arthroplasty (Adult)  Goal: Signs and Symptoms of Listed Potential Problems Will be Absent, Minimized or Managed (Shoulder Arthroplasty)  Signs and symptoms of listed potential problems will be absent, minimized or managed by discharge/transition of care (reference Shoulder Arthroplasty (Adult) CPG).   Outcome: Improving  Pt. A&o, vss, up with SBA, voiding in urinal adequate amount, dressing changed, hvc d/c'd, taking oxycodone for pain, d/c instruction reviewed with pt. And family, d/c medication given to the pt. And pt. D/c to home with family at 1445.

## 2018-07-10 NOTE — DISCHARGE SUMMARY
DISCHARGE SUMMARY    NAME:  Martin Ceron  AGE:  68 year old  YOB: 1950  MRN#:  [id number]    Martin Ceron was admitted for elective reverse right total shoulder arthroplasty.  The surgery was performed on 7/9/2018.  The postoperative course is documented in the medical record.  There were no complications. The patient was felt ready for discharge home on POD #1 with post-discharge physical therapy and outpatient visit prearranged.     Lab Results   Component Value Date    HGB 11.7 (L) 07/10/2018    HGB 14.9 07/09/2018       The patient received 0 units transfusion.      FINAL DISCHARGE DIAGNOSIS:  Rotator cuff arthropathy right shoulder.               Acute blood loss anemia     SURGICAL PROCEDURE THIS ADMISSION:  Reverse right total shoulder arthroplasty.         ASH FIGUEROA MD      CC: Fax [phone]         Ash Figueroa MD

## 2018-07-10 NOTE — DISCHARGE INSTRUCTIONS
DISCHARGE INSTRUCTIONS FOR YOUR TOTAL SHOULDER REPLACEMENT      ASH FIGUEROA MD    Instructions to care for your wound at home:   Change the dressing daily.  Inspect your incision at the time of dressing change for redness, swelling, and drainage.  Some discoloration and bruising is common, but please notify my office if you have any concerns about the wound appearance.  You may shower directly over the wound beginning 4 days following your surgery.  Do not, however, submerge the wound under water until the sutures are removed and the wound completely sealed and without drainage.  You should let your arm hang at your side during the shower.  Do not actively lift or actively move the arm from your side when out of the sling.  To wash under your arm, simply lean over towards your operative shoulder to allow the arm to hang freely and gently separate from your body by gravity alone.         Assistive devices:  Sling for support.  Remove the sling at least 3 times daily to fully straighten your elbow.  Also move your wrist, hand, and fingers often to mobilize swelling and prevent stiffness.    Wearing a button-up blouse or shirt is recommended.  The shoulder sling or immobilizer may be worn over the outside of your clothes.  Always keep your surgical arm hanging at your side when donning or doffing the sling.  Slide your surgical arm into the shirt sleeve first, then the other arm.  When taking off the shirt or blouse, do the opposite - slide your unaffected arm out of the sleeve first, followed by the surgical arm.     Outpatient physical therapy and home exercises:  Your outpatient physical therapy following your surgery is prearranged by our office. You will have one PT visit at two weeks and one PT visit at 4 weeks after surgery.  Then, at six weeks, you will attend PT twice a week for 6 additional weeks.  You should have already scheduled your therapy sessions in advance.  If you have not done so, contact the  therapy location of your choice for your appointments for the physical therapy regimen that has been prescribed for you.  You should perform your home exercise program daily in addition to the sessions scheduled with your therapist.    Clinic follow-up appointment:  Your clinic follow-up visit has been prearranged at the time of your surgery scheduling.     Medications:  New discharge medications will include a pain medication and a stool softener while on the narcotic pain medication. Detailed instructions will come with those medications.  You will also receive instructions on when to resume your home medications.    Please use Aspirin 325 mg, 1 tablet daily for the next 6 weeks to prevent any blood clots from forming.    Antibiotic coverage will be needed before any type of dental procedure.  This is a life long recommendation.  You should notify your dentist of your total shoulder surgery and call your dentist or our office one week before a dental appointment for the antibiotics.       Call [phone] with any questions.     Richie Lopes MD

## 2018-07-10 NOTE — PROGRESS NOTES
[facility] Orthopedic Post-Op / Progress Note  Martin Ceron is a 68 year old male    Today's Date:7/10/2018  Admission Date: 2018  POD # 1 Reverse TSA         Interval History:   doing well  No significant pain.  CMS arm normal.  Wants to go home today after PT            Physical Exam:   All vitals have been reviewed  Temperatures:  Current - Temp: 98.3  F (36.8  C); Max - Temp  Av.4  F (36.3  C)  Min: 96.3  F (35.7  C)  Max: 98.3  F (36.8  C)  Pulse range: No Data Recorded  Blood pressure range: Systolic (24hrs), Av , Min:94 , Max:109   ; Diastolic (24hrs), Av, Min:58, Max:76      Intake/Output Summary (Last 24 hours) at 07/10/18 0831  Last data filed at 07/10/18 0600   Gross per 24 hour   Intake          3782.25 ml   Output             3200 ml   Net           582.25 ml       Dressing dry and intact.          Data:   All laboratory data related to this surgery reviewed      Lab Results   Component Value Date     07/10/2018    POTASSIUM 4.0 07/10/2018    CHLORIDE 105 07/10/2018    CO2 27 07/10/2018     (H) 07/10/2018     Lab Results   Component Value Date    HGB 11.7 (L) 07/10/2018    HGB 14.9 2018     Platelet Count (10e9/L)   Date Value   07/10/2018 197       All imaging studies related to this surgery reviewed  Hardware is intact and in good approximation         Assessment and Plan:    Assessment:  Doing well.  No immediate surgical complications identified.  Pain well-controlled.    Plan:  Discharge plan: Home today    Richie Lopes MD

## 2018-07-10 NOTE — PLAN OF CARE
Problem: Patient Care Overview  Goal: Plan of Care/Patient Progress Review  Discharge Planner PT   Patient plan for discharge: return home per OT evaluation  Current status: PT order received; Per OT evaluation and conversation with OT, patient was independent with all functional mobility. No IP PT needs identified. Order will be completed  Barriers to return to prior living situation: see OT evaluation  Recommendations for discharge: refer to OT eval  Rationale for recommendations: refer to OT eval       Entered by: Linh Richard 07/10/2018 2:21 PM

## 2018-07-10 NOTE — PLAN OF CARE
Problem: Patient Care Overview  Goal: Plan of Care/Patient Progress Review  Outcome: No Change  Pt advanced to regular diet, tolerating well. Pt on CAPNO. Hemovac collection from R shoulder. Pt voiding in burgess. Pain managed with PO meds.

## 2018-07-10 NOTE — PLAN OF CARE
Problem: Patient Care Overview  Goal: Plan of Care/Patient Progress Review  A&O. VSS on RA. Pt up to stand at side of bed with A1. Pain managed with oxycodone, Tylenol and toradol. CMS intact. Dressing CDI. Sling in place. Hemovac intact. Difficulty voiding this shift staright cath for 900ml, DTV. IVF infusing.

## 2018-07-10 NOTE — PLAN OF CARE
"Problem: Patient Care Overview  Goal: Plan of Care/Patient Progress Review  Discharge Planner OT   Patient plan for discharge: home w/spouse  Current status: OT dio and treatment completed on POD#1 R reverse TSA.See OT dio for full report.  Pt seen BID today for training in post-op ROM of R elb, wrist, hand and in modified ADL strategies. Following the two sessions pt demonstrated indep with HEP but continues to need A with sling mgmt and dressing which spouse will provide x 1 week post-op - OT made recommendations to pt to wear \"easier\" clothing such as pullover shirts, pants or shorts with elastic waists, etc (verus jeans w/belts and button down shirts requiring use of bilateral hands). Pt indep bed mob, amb without AD.  OT dc'd in lieu of planned hospital dc  Barriers to return to prior living situation: none noted  Recommendations for discharge: home w/spouse assist and OP PT  Rationale for recommendations: pt tolerating activity well, is able to do post-op ex's, has assist avail for dressing tasks if needed.       Entered by: John Gutiérrez 07/10/2018 2:30 PM       Comments: Occupational Therapy Discharge Summary    Reason for therapy discharge:    Discharged to home with outpatient therapy.    Progress towards therapy goal(s). See goals on Care Plan in [facility] electronic health record for goal details.  Goals partially met.  Barriers to achieving goals:   discharge from facility.    Therapy recommendation(s):    Recommended assist w/dressing at home until indep, will have OP PT per MD protocol at approx 6wks.    "

## (undated) DEVICE — SPONGE LAP 18X18" X8435

## (undated) DEVICE — PILOT FOR CANNULATED REAMER

## (undated) DEVICE — PACK SET-UP STD 9102

## (undated) DEVICE — LINEN TOWEL PACK X5 5464

## (undated) DEVICE — PREP CHLORAPREP 26ML TINTED ORANGE  260815

## (undated) DEVICE — DRSG XEROFORM 5X9" 8884431605

## (undated) DEVICE — HOOD FLYTE W/PEELAWAY 408-800-100

## (undated) DEVICE — SUCTION TIP YANKAUER W/O VENT K86

## (undated) DEVICE — GLOVE PROTEXIS POWDER FREE 8.0 ORTHOPEDIC 2D73ET80

## (undated) DEVICE — SU VICRYL 2-0 CP-1 27" J466H

## (undated) DEVICE — DRILL BIT TORNIER 3MM REVERSE STERILE DWD055

## (undated) DEVICE — SU VICRYL 3-0 PS-1 18" UND J683

## (undated) DEVICE — BONE CLEANING TIP INTERPULSE  0210-010-000

## (undated) DEVICE — BLADE KNIFE SURG 15 371115

## (undated) DEVICE — GOWN IMPERVIOUS SPECIALTY XLG/XLONG 32474

## (undated) DEVICE — GLOVE PROTEXIS W/NEU-THERA 8.0  2D73TE80

## (undated) DEVICE — STPL SKIN PROXIMATE 35 WIDE PMW35

## (undated) DEVICE — SU FIBERWIRE 2 38"  AR-7200

## (undated) DEVICE — ESU PENCIL W/SMOKE EVAC NEPTUNE STRYKER 0703-046-000

## (undated) DEVICE — DRAPE STERI U 1015

## (undated) DEVICE — ESU GROUND PAD UNIVERSAL W/O CORD

## (undated) DEVICE — PACK OPEN SHOULDER SOP15OCFSC

## (undated) DEVICE — PREP SKIN SCRUB TRAY 4461A

## (undated) DEVICE — SOL NACL 0.9% IRRIG 1000ML BOTTLE 07138-09

## (undated) DEVICE — MANIFOLD NEPTUNE 4 PORT 700-20

## (undated) DEVICE — DRAPE CONVERTORS U-DRAPE 60X72" 8476

## (undated) DEVICE — DRAPE SHEET REV FOLD 3/4 9349

## (undated) DEVICE — ESU ELEC BLADE 4" COATED

## (undated) DEVICE — SU ETHIBOND 1 CT-1 30" X425H

## (undated) DEVICE — Device

## (undated) DEVICE — BLADE KNIFE SURG 10 371110

## (undated) DEVICE — SU VICRYL 0 CTX 36" J370H

## (undated) DEVICE — BLADE SAW SAGITTAL STRK 25X79.5X1.24MM 4/2000 2108-318-000

## (undated) DEVICE — SU NDL MAYO TROCAR MED 217003

## (undated) DEVICE — GLOVE PROTEXIS POWDER FREE 8.5 ORTHOPEDIC 2D73ET85

## (undated) DEVICE — SOL WATER IRRIG 1000ML BOTTLE 2F7114

## (undated) DEVICE — SUCTION IRR SYSTEM W/O TIP INTERPULSE HANDPIECE 0210-100-000

## (undated) RX ORDER — FENTANYL CITRATE 50 UG/ML
INJECTION, SOLUTION INTRAMUSCULAR; INTRAVENOUS
Status: DISPENSED
Start: 2018-07-09

## (undated) RX ORDER — ONDANSETRON 2 MG/ML
INJECTION INTRAMUSCULAR; INTRAVENOUS
Status: DISPENSED
Start: 2018-07-09

## (undated) RX ORDER — GLYCOPYRROLATE 0.2 MG/ML
INJECTION, SOLUTION INTRAMUSCULAR; INTRAVENOUS
Status: DISPENSED
Start: 2018-07-09

## (undated) RX ORDER — NEOSTIGMINE METHYLSULFATE 1 MG/ML
VIAL (ML) INJECTION
Status: DISPENSED
Start: 2018-07-09

## (undated) RX ORDER — CEFAZOLIN SODIUM 2 G/100ML
INJECTION, SOLUTION INTRAVENOUS
Status: DISPENSED
Start: 2018-07-09

## (undated) RX ORDER — DEXAMETHASONE SODIUM PHOSPHATE 4 MG/ML
INJECTION, SOLUTION INTRA-ARTICULAR; INTRALESIONAL; INTRAMUSCULAR; INTRAVENOUS; SOFT TISSUE
Status: DISPENSED
Start: 2018-07-09

## (undated) RX ORDER — PROPOFOL 10 MG/ML
INJECTION, EMULSION INTRAVENOUS
Status: DISPENSED
Start: 2018-07-09

## (undated) RX ORDER — LIDOCAINE HYDROCHLORIDE 20 MG/ML
INJECTION, SOLUTION EPIDURAL; INFILTRATION; INTRACAUDAL; PERINEURAL
Status: DISPENSED
Start: 2018-07-09